# Patient Record
Sex: FEMALE | Race: WHITE | NOT HISPANIC OR LATINO | Employment: STUDENT | URBAN - METROPOLITAN AREA
[De-identification: names, ages, dates, MRNs, and addresses within clinical notes are randomized per-mention and may not be internally consistent; named-entity substitution may affect disease eponyms.]

---

## 2023-07-24 LAB
HEPATITIS B VIRUS SURFACE AG PRESENCE IN SERUM: NONREACTIVE
HEPATITIS C VIRUS AB PRESENCE IN SERUM: NONREACTIVE
HIV 1/ 2 AG/AB SCREEN: NONREACTIVE
SYPHILIS TOTAL AB: NONREACTIVE

## 2023-07-25 LAB
CHLAMYDIA TRACH., AMPLIFIED: NEGATIVE
N. GONORRHEA, AMPLIFIED: NEGATIVE
TRICHOMONAS VAGINALIS: NEGATIVE

## 2023-10-09 ENCOUNTER — TELEPHONE (OUTPATIENT)
Dept: OBSTETRICS AND GYNECOLOGY | Facility: CLINIC | Age: 23
End: 2023-10-09
Payer: COMMERCIAL

## 2023-10-11 DIAGNOSIS — B00.9 HERPES: Primary | ICD-10-CM

## 2023-10-11 RX ORDER — VALACYCLOVIR HYDROCHLORIDE 500 MG/1
500 TABLET, FILM COATED ORAL DAILY
Qty: 30 TABLET | Refills: 11 | Status: SHIPPED | OUTPATIENT
Start: 2023-10-11 | End: 2024-10-10

## 2023-10-11 RX ORDER — VALACYCLOVIR HYDROCHLORIDE 500 MG/1
500 TABLET, FILM COATED ORAL DAILY
COMMUNITY
End: 2023-10-11 | Stop reason: SDUPTHER

## 2023-10-11 RX ORDER — VALACYCLOVIR HYDROCHLORIDE 500 MG/1
500 TABLET, FILM COATED ORAL 2 TIMES DAILY
Qty: 6 TABLET | Refills: 5 | Status: SHIPPED | OUTPATIENT
Start: 2023-10-11 | End: 2023-10-11 | Stop reason: SDUPTHER

## 2023-11-04 PROBLEM — B00.9 HERPES: Status: ACTIVE | Noted: 2023-11-04

## 2023-11-04 PROBLEM — L03.119 CELLULITIS OF FOOT: Status: ACTIVE | Noted: 2023-11-04

## 2023-11-04 PROBLEM — R51.9 WORSENING HEADACHES: Status: ACTIVE | Noted: 2023-11-04

## 2023-11-04 PROBLEM — M67.02 TIGHT HEEL CORDS, ACQUIRED, BILATERAL: Status: ACTIVE | Noted: 2023-11-04

## 2023-11-04 PROBLEM — F41.9 ANXIETY AND DEPRESSION: Status: ACTIVE | Noted: 2023-11-04

## 2023-11-04 PROBLEM — F32.A ANXIETY AND DEPRESSION: Status: ACTIVE | Noted: 2023-11-04

## 2023-11-04 PROBLEM — R51.9 CHRONIC HEADACHES: Status: ACTIVE | Noted: 2023-11-04

## 2023-11-04 PROBLEM — G90.50 COMPLEX REGIONAL PAIN SYNDROME I: Status: ACTIVE | Noted: 2023-11-04

## 2023-11-04 PROBLEM — G90.512 COMPLEX REGIONAL PAIN SYNDROME TYPE 1 OF LEFT UPPER EXTREMITY: Status: ACTIVE | Noted: 2023-11-04

## 2023-11-04 PROBLEM — G43.909 MIGRAINE: Status: ACTIVE | Noted: 2023-11-04

## 2023-11-04 PROBLEM — G43.709 CHRONIC MIGRAINE WITHOUT AURA: Status: ACTIVE | Noted: 2023-11-04

## 2023-11-04 PROBLEM — G89.29 CHRONIC HEADACHES: Status: ACTIVE | Noted: 2023-11-04

## 2023-11-04 PROBLEM — M76.72 PERONEAL TENDINITIS OF LEFT LOWER EXTREMITY: Status: ACTIVE | Noted: 2023-11-04

## 2023-11-04 PROBLEM — G90.529: Status: ACTIVE | Noted: 2023-11-04

## 2023-11-04 PROBLEM — E66.01 MORBID OBESITY (MULTI): Status: ACTIVE | Noted: 2023-11-04

## 2023-11-04 PROBLEM — M76.71 PERONEAL TENDINITIS OF RIGHT LOWER EXTREMITY: Status: ACTIVE | Noted: 2023-11-04

## 2023-11-04 PROBLEM — M67.01 TIGHT HEEL CORDS, ACQUIRED, BILATERAL: Status: ACTIVE | Noted: 2023-11-04

## 2023-11-04 PROBLEM — M76.70 PERONEAL TENDINITIS: Status: ACTIVE | Noted: 2023-11-04

## 2023-11-04 PROBLEM — F95.8 MOTOR TIC DISORDER: Status: ACTIVE | Noted: 2023-11-04

## 2023-11-04 RX ORDER — FLUOXETINE HYDROCHLORIDE 40 MG/1
1 CAPSULE ORAL DAILY
COMMUNITY
Start: 2022-11-09

## 2023-11-04 RX ORDER — VENLAFAXINE HYDROCHLORIDE 75 MG/1
1 CAPSULE, EXTENDED RELEASE ORAL DAILY
COMMUNITY
Start: 2023-09-20

## 2023-11-04 RX ORDER — TRAZODONE HYDROCHLORIDE 150 MG/1
1 TABLET ORAL NIGHTLY PRN
COMMUNITY

## 2023-11-04 RX ORDER — CYCLOBENZAPRINE HCL 10 MG
1 TABLET ORAL EVERY 8 HOURS PRN
COMMUNITY
Start: 2023-08-14

## 2023-11-04 RX ORDER — NORETHINDRONE ACETATE AND ETHINYL ESTRADIOL AND FERROUS FUMARATE 1MG-20(21)
1 KIT ORAL DAILY
COMMUNITY
Start: 2022-11-14

## 2023-11-04 RX ORDER — LAMOTRIGINE 25 MG/1
1 TABLET ORAL DAILY
COMMUNITY
Start: 2022-12-07

## 2023-11-04 RX ORDER — RIZATRIPTAN BENZOATE 10 MG/1
1 TABLET ORAL ONCE AS NEEDED
COMMUNITY
Start: 2023-02-18

## 2023-11-04 RX ORDER — KETOROLAC TROMETHAMINE 10 MG/1
1 TABLET, FILM COATED ORAL EVERY 6 HOURS PRN
COMMUNITY

## 2023-11-04 RX ORDER — LURASIDONE HYDROCHLORIDE 20 MG/1
1 TABLET, FILM COATED ORAL DAILY
COMMUNITY
Start: 2023-02-08

## 2023-11-04 RX ORDER — METOPROLOL TARTRATE 50 MG/1
1 TABLET ORAL NIGHTLY
COMMUNITY
Start: 2023-01-16

## 2023-11-04 RX ORDER — SERTRALINE HYDROCHLORIDE 50 MG/1
1 TABLET, FILM COATED ORAL DAILY
COMMUNITY
Start: 2023-01-11

## 2023-11-04 RX ORDER — IBUPROFEN 800 MG/1
1 TABLET ORAL
COMMUNITY
Start: 2023-08-14

## 2023-11-04 RX ORDER — LEVONORGESTREL 52 MG/1
1 INTRAUTERINE DEVICE INTRAUTERINE
COMMUNITY
Start: 2021-07-12

## 2023-11-04 RX ORDER — LURASIDONE HYDROCHLORIDE 40 MG/1
1 TABLET, FILM COATED ORAL DAILY
COMMUNITY
Start: 2023-09-20

## 2023-11-04 RX ORDER — FREMANEZUMAB-VFRM 225 MG/1.5ML
1 INJECTION SUBCUTANEOUS
COMMUNITY
Start: 2023-03-08

## 2023-11-04 RX ORDER — LIDOCAINE 560 MG/1
1 PATCH PERCUTANEOUS; TOPICAL; TRANSDERMAL DAILY
COMMUNITY
Start: 2023-08-14

## 2023-11-04 RX ORDER — LAMOTRIGINE 100 MG/1
1 TABLET ORAL DAILY
COMMUNITY

## 2023-11-04 RX ORDER — SUMATRIPTAN SUCCINATE 100 MG/1
1 TABLET ORAL AS NEEDED
COMMUNITY

## 2023-11-04 RX ORDER — NAPROXEN 500 MG/1
1 TABLET ORAL EVERY 12 HOURS PRN
COMMUNITY
Start: 2023-02-22

## 2023-11-04 RX ORDER — ADAPALENE 1 MG/G
1 GEL TOPICAL
COMMUNITY
Start: 2023-05-23

## 2023-11-04 RX ORDER — DIPHENHYDRAMINE HCL 25 MG
1 CAPSULE ORAL
COMMUNITY
Start: 2022-09-16

## 2023-11-04 RX ORDER — CLINDAMYCIN PHOSPHATE 10 UG/ML
1 LOTION TOPICAL
COMMUNITY
Start: 2022-11-29

## 2023-11-04 RX ORDER — ONDANSETRON 4 MG/1
TABLET, ORALLY DISINTEGRATING ORAL
COMMUNITY

## 2023-11-04 RX ORDER — TOPIRAMATE 25 MG/1
1 TABLET ORAL NIGHTLY
COMMUNITY
Start: 2023-01-19

## 2024-08-15 ENCOUNTER — APPOINTMENT (OUTPATIENT)
Dept: NEUROLOGY | Facility: CLINIC | Age: 24
End: 2024-08-15
Payer: COMMERCIAL

## 2024-08-16 ENCOUNTER — TELEPHONE (OUTPATIENT)
Dept: OBSTETRICS AND GYNECOLOGY | Facility: CLINIC | Age: 24
End: 2024-08-16
Payer: COMMERCIAL

## 2024-08-16 DIAGNOSIS — B00.9 HERPES: Primary | ICD-10-CM

## 2024-08-16 RX ORDER — VALACYCLOVIR HYDROCHLORIDE 1 G/1
1000 TABLET, FILM COATED ORAL 2 TIMES DAILY
Qty: 20 TABLET | Refills: 0 | Status: SHIPPED | OUTPATIENT
Start: 2024-08-16 | End: 2024-08-26

## 2024-08-16 NOTE — TELEPHONE ENCOUNTER
Pt called she is currently having an HSV outbreak. Pt does currently have the Valtrex but is looking for the stronger script she has taken previously to clear up the out break. Please advise.

## 2024-09-04 ENCOUNTER — TELEMEDICINE (OUTPATIENT)
Dept: NEUROLOGY | Facility: CLINIC | Age: 24
End: 2024-09-04
Payer: COMMERCIAL

## 2024-09-04 DIAGNOSIS — G43.709 CHRONIC MIGRAINE WITHOUT AURA WITHOUT STATUS MIGRAINOSUS, NOT INTRACTABLE: Primary | ICD-10-CM

## 2024-09-04 PROCEDURE — 99214 OFFICE O/P EST MOD 30 MIN: CPT | Performed by: NURSE PRACTITIONER

## 2024-09-04 RX ORDER — FREMANEZUMAB-VFRM 225 MG/1.5ML
225 INJECTION SUBCUTANEOUS
Qty: 1 EACH | Refills: 11 | Status: SHIPPED | OUTPATIENT
Start: 2024-09-04

## 2024-09-04 NOTE — PROGRESS NOTES
Assessed today for follow-up of migraine.  She reports that she has been doing well on the Ajovy.  She has had 3 or 4 breakthrough migraines in the past year.  We had initially tried sumatriptan which caused side effects.  The we then tried rizatriptan which again caused side effects and patient ended up in the ER.  We will continue the Ajovy as she has been taking it for preventative treatment.  For abortive treatment would like to try her on Nurtec.  Discussed role of medicine, importance of taking medications, potential risks, benefits, and precautions to be taken.  Reviewed sleep hygiene and dietary modifications.  Patient to notify office after second use of Nurtec regarding efficacy.  Patient verbalized understanding.  Follow-up in 1 year or sooner if needed.    This note was created with voice recognition software and was not corrected for typographical or grammatical errors

## 2024-10-28 ENCOUNTER — APPOINTMENT (OUTPATIENT)
Dept: NEUROLOGY | Facility: CLINIC | Age: 24
End: 2024-10-28
Payer: COMMERCIAL

## 2025-05-02 ENCOUNTER — APPOINTMENT (OUTPATIENT)
Dept: PRIMARY CARE | Facility: CLINIC | Age: 25
End: 2025-05-02
Payer: COMMERCIAL

## 2025-05-05 ENCOUNTER — APPOINTMENT (OUTPATIENT)
Dept: PRIMARY CARE | Facility: CLINIC | Age: 25
End: 2025-05-05
Payer: COMMERCIAL

## 2025-05-05 VITALS
DIASTOLIC BLOOD PRESSURE: 80 MMHG | WEIGHT: 293 LBS | OXYGEN SATURATION: 95 % | SYSTOLIC BLOOD PRESSURE: 118 MMHG | HEART RATE: 96 BPM | HEIGHT: 65 IN | BODY MASS INDEX: 48.82 KG/M2

## 2025-05-05 DIAGNOSIS — E66.813 CLASS 3 SEVERE OBESITY DUE TO EXCESS CALORIES WITHOUT SERIOUS COMORBIDITY WITH BODY MASS INDEX (BMI) OF 60.0 TO 69.9 IN ADULT: ICD-10-CM

## 2025-05-05 DIAGNOSIS — E53.8 VITAMIN B12 DEFICIENCY: ICD-10-CM

## 2025-05-05 DIAGNOSIS — R63.5 WEIGHT GAIN: ICD-10-CM

## 2025-05-05 DIAGNOSIS — Z13.21 ENCOUNTER FOR VITAMIN DEFICIENCY SCREENING: ICD-10-CM

## 2025-05-05 DIAGNOSIS — Z00.00 WELLNESS EXAMINATION: Primary | ICD-10-CM

## 2025-05-05 DIAGNOSIS — G47.33 OBSTRUCTIVE SLEEP APNEA SYNDROME: ICD-10-CM

## 2025-05-05 PROCEDURE — 3008F BODY MASS INDEX DOCD: CPT

## 2025-05-05 PROCEDURE — 1036F TOBACCO NON-USER: CPT

## 2025-05-05 PROCEDURE — 99385 PREV VISIT NEW AGE 18-39: CPT

## 2025-05-05 RX ORDER — CHOLECALCIFEROL (VITAMIN D3) 25 MCG
1000 TABLET ORAL
COMMUNITY
Start: 2025-03-12 | End: 2026-03-12

## 2025-05-05 RX ORDER — DULAGLUTIDE 1.5 MG/.5ML
1.5 INJECTION, SOLUTION SUBCUTANEOUS WEEKLY
COMMUNITY

## 2025-05-05 ASSESSMENT — PATIENT HEALTH QUESTIONNAIRE - PHQ9
SUM OF ALL RESPONSES TO PHQ9 QUESTIONS 1 AND 2: 0
2. FEELING DOWN, DEPRESSED OR HOPELESS: NOT AT ALL
1. LITTLE INTEREST OR PLEASURE IN DOING THINGS: NOT AT ALL

## 2025-05-05 NOTE — PROGRESS NOTES
"  Subjective   Patient ID: Leticia Goode is a 24 y.o. female who presents for Establish Care (Pt states she is here to establish care. Pt states she does see an OBGYN. Last pap 2024. No other concerns at the moment.).    Patient presents to establish care and have yearly physical.  Patient states that she has been working with [facility] weight management clinic, and a nutritionist.  Patient states that they have started her on Trulicity however she has not had any changes in her weight.  Patient states that for breakfast she generally eats a protein shake, for lunch she will have a hard-boiled egg, yogurt with fruit, and for dinner will have chicken, rice, broccoli.  Patient states that she does have a stationary bike, and takes walks with her daughter and dog.    Patient states that she does have a history of anxiety/depression/mood disorder and does follow-up regularly with psychiatry who prescribes her Lamictal, Latuda, Effexor, trazodone.      Past Medical, Surgical, Social and Family Hx reviewed-Yes        Last pap: 2024  Last Mammogram: N/A  Colon CA screening Hx: N/A  Labs: TSH  Up to date on immunizations: Up to date  Dentist in the past year: Yes     Menstruation: No periods with Mirena   Sexual Activity: No concerns         PE:  /80   Pulse 96   Ht 1.651 m (5' 5\")   Wt (!) 166 kg (367 lb)   SpO2 95%   BMI 61.07 kg/m²   Alert adult woman, NAD  HEENT clear  Neck supple, No LAD  Heart RRR no murmur  Lungs CTA bilat  Abdomen benign, normal BS, soft NT/ND  Skin warm, dry, clear  Neuro grossly normal, gait WNL  Affect pleasant and appropriate, memory and judgement WNL    Discussed with patient that with her diagnosis of sleep apnea, we will trial Zepbound and see if we can get her approved.  Discussed with patient if that is the case we will stop the Trulicity.  We also discussed different lifestyle modifications that she can do to her improve her overall weight.  Lab work was ordered to rule " out further causes of her obesity.  If patient's Zepbound gets approved, we will see her back in 1 month.    Assessment/Plan   Problem List Items Addressed This Visit           ICD-10-CM    Wellness examination - Primary Z00.00    Weight gain R63.5    Relevant Orders    Tsh With Reflex To Free T4 If Abnormal    Testosterone, total and free    Comprehensive metabolic panel    Vitamin B12 deficiency E53.8    Relevant Orders    Vitamin B12    Methylmalonic acid, serum    Comprehensive metabolic panel    Obstructive sleep apnea syndrome G47.33    Relevant Medications    tirzepatide, weight loss, (Zepbound) 2.5 mg/0.5 mL injection    Class 3 severe obesity due to excess calories without serious comorbidity with body mass index (BMI) of 60.0 to 69.9 in adult E66.813, Z68.44    Relevant Medications    tirzepatide, weight loss, (Zepbound) 2.5 mg/0.5 mL injection     Other Visit Diagnoses         Codes      Encounter for vitamin deficiency screening     Z13.21    Relevant Orders    Vitamin D 25-Hydroxy,Total (for eval of Vitamin D levels)

## 2025-05-06 ENCOUNTER — APPOINTMENT (OUTPATIENT)
Dept: OBSTETRICS AND GYNECOLOGY | Facility: CLINIC | Age: 25
End: 2025-05-06
Payer: COMMERCIAL

## 2025-05-07 ENCOUNTER — APPOINTMENT (OUTPATIENT)
Dept: PRIMARY CARE | Facility: CLINIC | Age: 25
End: 2025-05-07
Payer: COMMERCIAL

## 2025-05-08 ENCOUNTER — TELEMEDICINE (OUTPATIENT)
Dept: PRIMARY CARE | Facility: CLINIC | Age: 25
End: 2025-05-08
Payer: COMMERCIAL

## 2025-05-08 ENCOUNTER — TELEPHONE (OUTPATIENT)
Dept: PRIMARY CARE | Facility: CLINIC | Age: 25
End: 2025-05-08

## 2025-05-08 DIAGNOSIS — J02.9 PHARYNGITIS, UNSPECIFIED ETIOLOGY: Primary | ICD-10-CM

## 2025-05-08 LAB
25(OH)D3+25(OH)D2 SERPL-MCNC: 23 NG/ML (ref 30–100)
ALBUMIN SERPL-MCNC: 4.3 G/DL (ref 3.6–5.1)
ALP SERPL-CCNC: 58 U/L (ref 31–125)
ALT SERPL-CCNC: 19 U/L (ref 6–29)
ANION GAP SERPL CALCULATED.4IONS-SCNC: 11 MMOL/L (CALC) (ref 7–17)
AST SERPL-CCNC: 16 U/L (ref 10–30)
BILIRUB SERPL-MCNC: 0.7 MG/DL (ref 0.2–1.2)
BUN SERPL-MCNC: 5 MG/DL (ref 7–25)
CALCIUM SERPL-MCNC: 9.2 MG/DL (ref 8.6–10.2)
CHLORIDE SERPL-SCNC: 103 MMOL/L (ref 98–110)
CO2 SERPL-SCNC: 26 MMOL/L (ref 20–32)
CREAT SERPL-MCNC: 0.77 MG/DL (ref 0.5–0.96)
EGFRCR SERPLBLD CKD-EPI 2021: 112 ML/MIN/1.73M2
GLUCOSE SERPL-MCNC: 83 MG/DL (ref 65–99)
METHYLMALONATE SERPL-SCNC: 84 NMOL/L (ref 55–335)
POTASSIUM SERPL-SCNC: 4.2 MMOL/L (ref 3.5–5.3)
PROT SERPL-MCNC: 7 G/DL (ref 6.1–8.1)
SODIUM SERPL-SCNC: 140 MMOL/L (ref 135–146)
TESTOST FREE SERPL-MCNC: 7.1 PG/ML (ref 0.1–6.4)
TESTOST SERPL-MCNC: 54 NG/DL (ref 2–45)
TSH SERPL-ACNC: 1.48 MIU/L
VIT B12 SERPL-MCNC: 278 PG/ML (ref 200–1100)

## 2025-05-08 PROCEDURE — 99214 OFFICE O/P EST MOD 30 MIN: CPT | Performed by: FAMILY MEDICINE

## 2025-05-08 RX ORDER — PHENTERMINE HYDROCHLORIDE 37.5 MG/1
1 TABLET ORAL
COMMUNITY
Start: 2025-02-21 | End: 2025-05-08 | Stop reason: WASHOUT

## 2025-05-08 RX ORDER — AMOXICILLIN 500 MG/1
500 CAPSULE ORAL 2 TIMES DAILY
Qty: 20 CAPSULE | Refills: 0 | Status: SHIPPED | OUTPATIENT
Start: 2025-05-08 | End: 2025-05-18

## 2025-05-08 RX ORDER — LAMOTRIGINE 150 MG/1
2 TABLET ORAL
COMMUNITY
Start: 2025-04-21

## 2025-05-08 RX ORDER — VALACYCLOVIR HYDROCHLORIDE 500 MG/1
TABLET, FILM COATED ORAL
COMMUNITY

## 2025-05-08 RX ORDER — VENLAFAXINE HYDROCHLORIDE 150 MG/1
150 TABLET, EXTENDED RELEASE ORAL
COMMUNITY
Start: 2025-04-22

## 2025-05-08 RX ORDER — LIRAGLUTIDE 6 MG/ML
INJECTION SUBCUTANEOUS
COMMUNITY
Start: 2025-03-08 | End: 2025-05-08 | Stop reason: WASHOUT

## 2025-05-08 RX ORDER — LURASIDONE HYDROCHLORIDE 80 MG/1
TABLET, FILM COATED ORAL
COMMUNITY
Start: 2025-04-14

## 2025-05-08 RX ORDER — FLUTICASONE PROPIONATE 50 MCG
SPRAY, SUSPENSION (ML) NASAL
COMMUNITY
Start: 2025-03-26 | End: 2025-05-08 | Stop reason: WASHOUT

## 2025-05-08 NOTE — PROGRESS NOTES
I performed this visit using real-time telehealth tools, including an audio/video OR telephone connection between the patient listed who was located in the [facility] and myself, Amy Ma (Board certified in the [facility]).At the start of the visit, I introduced myself as Dr. Garcia and verified the patients name, , and current physical location.  If they were currently outside of the state of [place], the visit was ended and the patient was referred to alternative means for evaluation and treatment.  The patient was made aware of the limitations of the telehealth visit.  They will not be physically examined and all issues may not be appropriate for a telehealth visit.  If necessary, an in-    In preparing for this visit and writing this note, I reviewed previous electronic medical records (labs, imaging and medical charts) of the patient available in the physician portal. Significant findings which helped in decision making are recorded in this encounter charting.  All allergies were reviewed with the patient and all medications reconciled verbally with the patient at the beginning oft the visit.    Chief complaint:     ST--started yesterday-- salt water and listerine  Worse today  Concerned because 12 year old brother with strep--she lives with him--dx last Thursday--   Has not taken temp but feels hot today  No chills  Cough started today  Very ST  Occas sneezing from allergies  LN in neck normal  Eating and drinking fine  No HA-- had dental work work so may have some from that  Gagging but no other GI sxs      All other ROS (-)    General appearance:  Vitals available from patient? no  Alert, oriented, pleasant, in no apparent distress? yes  Answers questions appropriately? yes  Eyes clear? yes  Is patient in respiratory distress? no  Throat exam: not available  Is patient coughing during consult: NO  Audible wheezing noted? : no  Pt sounds congested?:  no  Sniffing or rhinorrhea?:  no  Tender neck  LAD by pt exam?: NO  Psychiatric: Affect normal? yes  Other relevant physical exam:    Assessment and Plan:    Pharyngitis--presumed strep  Amox 500mg BID x 10 days  Tylenol or motrin as needed      Discussed with patient during visit  differential diagnosis; viral versus bacterial infection;  (if relevant); use of medications prescribed and possible side effects; appropriate over-the-counter medications; possible complications ; when to follow-up for in person evaluation.  All patient's questions were answered. Patient has good decision making capacity.  They are alert to person, place, time and situation.  Patient has the ability to communicate choice, understand information, consequences, and reason rationally.  If sent for in person care at  or ED,    I explained why Urgent in person exam was necessary and that failure to have further evaluation, and treatment today may lead to, negative outcomes, permanent disability, or even death.   If not sent for in person care today,   follow-up with your PCP in 2-3 days, sooner if not  improving.    Follow-up immediately if symptoms worsen.   If experiencing symptoms including but not limited to lethargy / chest pain / weakness / dizziness / difficulty breathing please call 911 or go to the closest emergency department for immediate care.   Limitations to telemedicine include inability to do a complete and accurate physical exam.    Any concerns regarding this were conveyed with the patient and in person follow-up recommended if the nature of their concern/illness does not progress as anticipated during this visit.

## 2025-05-08 NOTE — PATIENT INSTRUCTIONS
"Pharyngitis--presumed strep  Amox 500mg BID x 10 days  Tylenol or motrin as needed    Please send me a COGEONt message if you have any questions or concerns.  FOR NON URGENT questions only.  Allow up to 72 hours for response.    If you have prescription issues or other questions you can email   Killian Alfonso, [facility] Health Coordinator, at   [email or web address]     Rest and drink plenty of fluids    Tylenol and or motrin as needed for pain and fever (unless you have been told not to take these because of your personal medical history)    Discussed options and precautions (complaint specific and may include)  Viral versus bacterial infection; use of medications; possible side effects; appropriate over-the-counter medications; possible complications and /or when to follow-up.    if sent for in person care at  or ED,  it was explained why and failure to have \"higher level of care\" further evaluation, and treatment today may lead, but is not limited to negative outcomes, permanent disability, or even death.     All red flags requiring in person care were discussed.    If not sent for in person care today, follow-up with your PCP in 2-3 days, sooner if not  improving.    Follow-up immediately if symptoms worsen. If experiencing symptoms including but not limited to lethargy / chest pain / weakness / dizziness / difficulty breathing please call 911 or go to the emergency department for immediate care.     All patient's questions were answered. Patient has good decision making capacity.  They are alert to person, place, time and situation.  Patient has the ability to communicate choice, understand information, consequences, and reason rationally.      ____________________________________________________________________________________________________________  To connect with a new PCP please visit [email or web address] or call [phone]                          "

## 2025-05-15 ENCOUNTER — PATIENT MESSAGE (OUTPATIENT)
Dept: PRIMARY CARE | Facility: CLINIC | Age: 25
End: 2025-05-15
Payer: COMMERCIAL

## 2025-05-15 DIAGNOSIS — G47.33 OBSTRUCTIVE SLEEP APNEA SYNDROME: ICD-10-CM

## 2025-05-15 DIAGNOSIS — E28.2 PCOS (POLYCYSTIC OVARIAN SYNDROME): Primary | ICD-10-CM

## 2025-05-15 DIAGNOSIS — E66.813 CLASS 3 SEVERE OBESITY DUE TO EXCESS CALORIES WITHOUT SERIOUS COMORBIDITY WITH BODY MASS INDEX (BMI) OF 60.0 TO 69.9 IN ADULT: ICD-10-CM

## 2025-05-19 ENCOUNTER — APPOINTMENT (OUTPATIENT)
Dept: PRIMARY CARE | Facility: CLINIC | Age: 25
End: 2025-05-19
Payer: COMMERCIAL

## 2025-05-21 ENCOUNTER — APPOINTMENT (OUTPATIENT)
Dept: DERMATOLOGY | Facility: CLINIC | Age: 25
End: 2025-05-21
Payer: COMMERCIAL

## 2025-05-21 DIAGNOSIS — L70.0 ACNE VULGARIS: ICD-10-CM

## 2025-05-21 DIAGNOSIS — L21.9 SEBORRHEIC DERMATITIS: Primary | ICD-10-CM

## 2025-05-21 DIAGNOSIS — L73.2 HIDRADENITIS SUPPURATIVA: ICD-10-CM

## 2025-05-21 PROCEDURE — 1036F TOBACCO NON-USER: CPT | Performed by: DERMATOLOGY

## 2025-05-21 PROCEDURE — 99214 OFFICE O/P EST MOD 30 MIN: CPT | Performed by: DERMATOLOGY

## 2025-05-21 RX ORDER — SPIRONOLACTONE 50 MG/1
50 TABLET, FILM COATED ORAL 2 TIMES DAILY
Qty: 60 TABLET | Refills: 1 | Status: SHIPPED | OUTPATIENT
Start: 2025-05-21 | End: 2025-07-20

## 2025-05-21 RX ORDER — CLINDAMYCIN PHOSPHATE 10 UG/ML
LOTION TOPICAL EVERY MORNING
Qty: 60 ML | Refills: 3 | Status: SHIPPED | OUTPATIENT
Start: 2025-05-21 | End: 2026-05-21

## 2025-05-21 RX ORDER — TRETINOIN 0.25 MG/G
CREAM TOPICAL NIGHTLY
Qty: 45 G | Refills: 3 | Status: SHIPPED | OUTPATIENT
Start: 2025-05-21 | End: 2026-05-21

## 2025-05-21 RX ORDER — KETOCONAZOLE 20 MG/G
CREAM TOPICAL 2 TIMES DAILY
Qty: 60 G | Refills: 11 | Status: SHIPPED | OUTPATIENT
Start: 2025-05-21

## 2025-05-21 RX ORDER — DULAGLUTIDE 3 MG/.5ML
3 INJECTION, SOLUTION SUBCUTANEOUS WEEKLY
Qty: 2 ML | Refills: 1 | Status: SHIPPED | OUTPATIENT
Start: 2025-05-21

## 2025-05-21 RX ORDER — BENZOYL PEROXIDE 50 MG/ML
LIQUID TOPICAL EVERY MORNING
Qty: 142 G | Refills: 3 | Status: SHIPPED | OUTPATIENT
Start: 2025-05-21 | End: 2026-05-21

## 2025-05-21 ASSESSMENT — DERMATOLOGY QUALITY OF LIFE (QOL) ASSESSMENT
RATE HOW BOTHERED YOU ARE BY EFFECTS OF YOUR SKIN PROBLEMS ON YOUR ACTIVITIES (EG, GOING OUT, ACCOMPLISHING WHAT YOU WANT, WORK ACTIVITIES OR YOUR RELATIONSHIPS WITH OTHERS): 0 - NEVER BOTHERED
RATE HOW EMOTIONALLY BOTHERED YOU ARE BY YOUR SKIN PROBLEM (FOR EXAMPLE, WORRY, EMBARRASSMENT, FRUSTRATION): 3
RATE HOW BOTHERED YOU ARE BY SYMPTOMS OF YOUR SKIN PROBLEM (EG, ITCHING, STINGING BURNING, HURTING OR SKIN IRRITATION): 0 - NEVER BOTHERED
WHAT SINGLE SKIN CONDITION LISTED BELOW IS THE PATIENT ANSWERING THE QUALITY-OF-LIFE ASSESSMENT QUESTIONS ABOUT: NONE OF THE ABOVE
RATE HOW BOTHERED YOU ARE BY EFFECTS OF YOUR SKIN PROBLEMS ON YOUR ACTIVITIES (EG, GOING OUT, ACCOMPLISHING WHAT YOU WANT, WORK ACTIVITIES OR YOUR RELATIONSHIPS WITH OTHERS): 0 - NEVER BOTHERED
RATE HOW EMOTIONALLY BOTHERED YOU ARE BY YOUR SKIN PROBLEM (FOR EXAMPLE, WORRY, EMBARRASSMENT, FRUSTRATION): 3
WHAT SINGLE SKIN CONDITION LISTED BELOW IS THE PATIENT ANSWERING THE QUALITY-OF-LIFE ASSESSMENT QUESTIONS ABOUT: NONE OF THE ABOVE
RATE HOW BOTHERED YOU ARE BY SYMPTOMS OF YOUR SKIN PROBLEM (EG, ITCHING, STINGING BURNING, HURTING OR SKIN IRRITATION): 0 - NEVER BOTHERED

## 2025-05-21 ASSESSMENT — PATIENT GLOBAL ASSESSMENT (PGA): WHAT IS THE PGA: PATIENT GLOBAL ASSESSMENT:  1 - CLEAR

## 2025-05-21 NOTE — PROGRESS NOTES
Subjective     Leticia Goode is a 24 y.o. female who presents for the following: Acne.     Last derm visit 5/23/23 for acne with Dr. Clark. Her acne is actually under fairly good control but her forehead is greasy but then it is dry and flaky around her nose. Has tried OTC moisturizers.     She also thinks she may have hidradenitis suppurativa but she has not seen someone in person. She works long hours M-F, hard to come in for in person appt. She gets boils on upper inner thighs and on mons. They are painful and appear intermittently. They were really bad a couple years ago especially on thighs. Often worse in summer. She does not know if it flares with her menstrual cycle because she uses a long-term contraception that disrupts her cycle.     Review of Systems:  No other skin or systemic complaints other than what is documented elsewhere in the note.    The following portions of the chart were reviewed this encounter and updated as appropriate:   Tobacco  Allergies  Meds  Problems  Med Hx  Surg Hx         Skin Cancer History  Biopsy Log Book  No skin cancers from Specimen Tracking.    Additional History      Specialty Problems    None       Objective   Well appearing patient in no apparent distress; mood and affect are within normal limits.    A focused skin examination was performed. All findings within normal limits unless otherwise noted below.    Assessment/Plan   Skin Exam  1. SEBORRHEIC DERMATITIS  Head - Anterior (Face)  Light pink dry flaky skin around nose  - start with ketoconazole cream  - can consider sulfur wash in future  Related Procedures  Follow Up In Dermatology - Established Patient  Related Medications  ketoconazole (NIZOral) 2 % cream  Apply topically 2 times a day. To affected areas for 2-4 weeks as needed for dry skin around nose then stop or continue on weekends for maintenance/prevention  2. ACNE VULGARIS  Head - Anterior (Face)  Minimal pink papules and open comedones. Oiliness  of glabella  -Discussed diagnosis of acne  -Discussed natural history of condition and expectations for treatment  -Recommend:  - topicals as below  Related Medications  benzoyl peroxide 5 % external wash  Apply topically once daily in the morning. To wash face. May use in the shower daily as body wash to hidradenitis suppurativa prone areas. May bleach fabrics, use an old or white towel  clindamycin (Cleocin T) 1 % lotion  Apply topically once daily in the morning. To new acne bumps on face. For hidradenitis suppurativa can use 2x per day as needed  tretinoin (Retin-A) 0.025 % cream  Apply topically once daily at bedtime. A pea-sized amount to the whole face, start every 2-3 nights and gradually increase to nightly  3. HIDRADENITIS SUPPURATIVA  Left Medial Thigh, Pubic, Right Medial Thigh  No exam on virtual visit    -Discussed nature of condition  -Discussed treatment options  - start with first-line therapy with benzoyl peroxide wash and clindamycin lotion  - can consider oral doxycycline for flares but would like to have a better evaluation first      Related Medications  benzoyl peroxide 5 % external wash  Apply topically once daily in the morning. To wash face. May use in the shower daily as body wash to hidradenitis suppurativa prone areas. May bleach fabrics, use an old or white towel  clindamycin (Cleocin T) 1 % lotion  Apply topically once daily in the morning. To new acne bumps on face. For hidradenitis suppurativa can use 2x per day as needed    Virtual or Telephone Consent    An interactive audio and video telecommunication system which permits real time communications between the patient (at the originating site) and provider (at the distant site) was utilized to provide this telehealth service.   Verbal consent was requested and obtained from Leticia Goode on this date, 05/21/25 for a telehealth visit and the patient's location was confirmed at the time of the visit.      Follow up 3 months virtual  visit   If hidradenitis suppurativa not improving would need to switch to in person  For now we will keep it at virtual visit since that is easier with her work schedule. She knows she would need to call ahead of time to switch to in person

## 2025-05-21 NOTE — PATIENT INSTRUCTIONS
"YOUR TOPICAL ACNE TREATMENT PLAN    Morning:    Wash face/body with:    Benzoyl peroxide  _4-5___% (Examples: PanOxyl, Clean&Clear) - this can bleach! Use an old towel or a white towel to dry off carefully             2. Apply medication(s):  Apply ___Clindamycin lotion__________________to affected areas of acne on face and/or body -- Use as a \"spot treatment\" for new and active acne bumps  Apply ketoconazole cream to dry skin around eyebrows and nose         3. Moisturize:  If dry, apply non-scented, non-comedogenic moisturizer of your choice to affected areas. Picking a moisturizer with SPF 30+ to use in the morning will help dark marks fade faster. Examples: Neutrogena, Cetaphil, CeraVe, EltaMD (online)        Evening:    Wash face with:    Gentle, non-medicated wash (Examples: Cetaphil, CeraVe, Neutrogena UltraGentle)         2. Apply medication(s):  Apply ____tretinoin_________________to affected areas of acne on face (see below for detailed instructions) -- Use ALL OVER, careful around edges of nose to treat current acne, prevent future acne, and treat old acne scars and marks    When applying topical medications to the face, use the “5-dot” method. Take a small pea-sized amount and place dots in each of 5 locations of your face: mid-forehead, each cheek, nose, and chin. Then rub in. You should not see a “film” of the medication on your skin; if you do, you're probably using too much.    Topical medications may lead to dryness where you use them. This almost always improves as your skin gets used to the medication (about 2-3 weeks). Some tips to get you through this time include waiting 15-20 minutes after washing before applying the topical medication and starting out with use every 2-3 days, gradually working up to “every day” use.         3. Moisturize:  If dry, apply non-scented, non-comedogenic moisturizer of your choice to affected areas. Examples: Neutrogena, Cetaphil, CeraVe, EltaMD " (online)          For the hidradenitis  - benzoyl peroxide wash as a body wash in the shower to upper inner thighs and mons. Use at least 2x per week, but you can use it daily  - clindamycin lotion can be used when you feel a boil or a flare. You can use it 2x per day as needed

## 2025-05-21 NOTE — Clinical Note
-Discussed nature of condition  -Discussed treatment options  - start with first-line therapy with benzoyl peroxide wash and clindamycin lotion  - can consider oral doxycycline for flares but would like to have a better evaluation first

## 2025-05-21 NOTE — Clinical Note
-Discussed diagnosis of acne  -Discussed natural history of condition and expectations for treatment  -Recommend:  - topicals as below

## 2025-06-05 DIAGNOSIS — G43.709 CHRONIC MIGRAINE WITHOUT AURA WITHOUT STATUS MIGRAINOSUS, NOT INTRACTABLE: Primary | ICD-10-CM

## 2025-06-09 ENCOUNTER — SPECIALTY PHARMACY (OUTPATIENT)
Dept: PHARMACY | Facility: CLINIC | Age: 25
End: 2025-06-09

## 2025-06-19 ENCOUNTER — APPOINTMENT (OUTPATIENT)
Dept: NEUROLOGY | Facility: CLINIC | Age: 25
End: 2025-06-19
Payer: COMMERCIAL

## 2025-06-24 ENCOUNTER — APPOINTMENT (OUTPATIENT)
Dept: OBSTETRICS AND GYNECOLOGY | Facility: CLINIC | Age: 25
End: 2025-06-24
Payer: COMMERCIAL

## 2025-07-11 DIAGNOSIS — E66.813 CLASS 3 SEVERE OBESITY DUE TO EXCESS CALORIES WITHOUT SERIOUS COMORBIDITY WITH BODY MASS INDEX (BMI) OF 60.0 TO 69.9 IN ADULT: Primary | ICD-10-CM

## 2025-07-17 ENCOUNTER — SPECIALTY PHARMACY (OUTPATIENT)
Dept: PHARMACY | Facility: CLINIC | Age: 25
End: 2025-07-17

## 2025-07-17 ENCOUNTER — TELEPHONE (OUTPATIENT)
Dept: PRIMARY CARE | Facility: CLINIC | Age: 25
End: 2025-07-17

## 2025-07-17 ENCOUNTER — OFFICE VISIT (OUTPATIENT)
Dept: NEUROLOGY | Facility: CLINIC | Age: 25
End: 2025-07-17
Payer: COMMERCIAL

## 2025-07-17 VITALS
SYSTOLIC BLOOD PRESSURE: 144 MMHG | BODY MASS INDEX: 47.09 KG/M2 | HEIGHT: 66 IN | DIASTOLIC BLOOD PRESSURE: 93 MMHG | WEIGHT: 293 LBS | HEART RATE: 88 BPM

## 2025-07-17 DIAGNOSIS — Z78.9 HEPATITIS B VACCINATION STATUS UNKNOWN: ICD-10-CM

## 2025-07-17 DIAGNOSIS — Z78.9 UNKNOWN VARICELLA VACCINATION STATUS: ICD-10-CM

## 2025-07-17 DIAGNOSIS — Z78.9 MEASLES, MUMPS, RUBELLA (MMR) VACCINATION STATUS UNKNOWN: Primary | ICD-10-CM

## 2025-07-17 DIAGNOSIS — G43.709 CHRONIC MIGRAINE WITHOUT AURA WITHOUT STATUS MIGRAINOSUS, NOT INTRACTABLE: Primary | ICD-10-CM

## 2025-07-17 PROCEDURE — RXMED WILLOW AMBULATORY MEDICATION CHARGE

## 2025-07-17 PROCEDURE — 99214 OFFICE O/P EST MOD 30 MIN: CPT | Performed by: NURSE PRACTITIONER

## 2025-07-17 PROCEDURE — 3008F BODY MASS INDEX DOCD: CPT | Performed by: NURSE PRACTITIONER

## 2025-07-17 RX ORDER — CARIPRAZINE 1.5 MG/1
1 CAPSULE, GELATIN COATED ORAL
COMMUNITY
Start: 2025-07-09

## 2025-07-17 RX ORDER — ONDANSETRON 4 MG/1
4 TABLET, ORALLY DISINTEGRATING ORAL EVERY 8 HOURS PRN
Qty: 20 TABLET | Refills: 3 | Status: SHIPPED | OUTPATIENT
Start: 2025-07-17

## 2025-07-17 ASSESSMENT — ENCOUNTER SYMPTOMS
OCCASIONAL FEELINGS OF UNSTEADINESS: 0
LOSS OF SENSATION IN FEET: 0
DEPRESSION: 0

## 2025-07-17 ASSESSMENT — ANXIETY QUESTIONNAIRES
GAD7 TOTAL SCORE: 0
5. BEING SO RESTLESS THAT IT IS HARD TO SIT STILL: NOT AT ALL
IF YOU CHECKED OFF ANY PROBLEMS ON THIS QUESTIONNAIRE, HOW DIFFICULT HAVE THESE PROBLEMS MADE IT FOR YOU TO DO YOUR WORK, TAKE CARE OF THINGS AT HOME, OR GET ALONG WITH OTHER PEOPLE: NOT DIFFICULT AT ALL
2. NOT BEING ABLE TO STOP OR CONTROL WORRYING: NOT AT ALL
4. TROUBLE RELAXING: NOT AT ALL
3. WORRYING TOO MUCH ABOUT DIFFERENT THINGS: NOT AT ALL
7. FEELING AFRAID AS IF SOMETHING AWFUL MIGHT HAPPEN: NOT AT ALL
1. FEELING NERVOUS, ANXIOUS, OR ON EDGE: NOT AT ALL
6. BECOMING EASILY ANNOYED OR IRRITABLE: NOT AT ALL

## 2025-07-17 ASSESSMENT — PATIENT HEALTH QUESTIONNAIRE - PHQ9
1. LITTLE INTEREST OR PLEASURE IN DOING THINGS: NOT AT ALL
2. FEELING DOWN, DEPRESSED OR HOPELESS: NOT AT ALL
SUM OF ALL RESPONSES TO PHQ9 QUESTIONS 1 AND 2: 0

## 2025-07-17 NOTE — ASSESSMENT & PLAN NOTE
Orders:    rimegepant (Nurtec ODT) 75 mg tablet,disintegrating; Dissolve 1 tablet (75 mg) in the mouth once daily if needed (migraine).

## 2025-07-17 NOTE — PROGRESS NOTES
Chief Complaint   Patient presents with    Migraine       Subjective   HPI  Leticia Goode is a 24 y.o. year old female who presents with chief complaint Chronic migraine without aura without status migrainosus, not intractable [G43.709]    Leticia is experiencing  12-16 HA days per month, 12-16 of the HAs meet migraine criteria.   Has been off of the Ajovy for 2 months due to insurance/pharmacy challenges.  The headaches are usually moderate, pounding, and throbbing and are located behind eyes/across forehead, generally symmetric.   Generally, headaches last about 1 hours in duration. With Nurtec.  Associated photophobia and nausea  The headaches are not positional.   Headaches are worsened with exertion. No change in character with sneezing, coughing and bending over.   The current rescue medication Nurtec starts to take effect within 1    hours and decreases the headache by 100%.   Work attendance or other daily activities affected:  pushes through the pain  Caffeine:  2 cups of coffee per day  Sleep:  7-8 hours per night  Stressors:  work  The patient denies the presence of any associated double vision, speech problems, confusion, facial or extremity weakness or numbness or problems with coordination. Denies other neurological history of seizures, stroke, or TIA.        Current/ past HA treatments:  Preventive:  Venlafaxine  Fluoxetine  Metoprolol  Topiramate  Ajovy - no longer on insurance formulary      Rescue:  Acetaminophen  Ibuprofen  Sumatriptan  Rizatriptan  Nurtec    Current Medications[1]    RX Allergies[2]    Medical History[3]  Surgical History[4]  Family History[5]  Social History     Tobacco Use    Smoking status: Never    Smokeless tobacco: Never   Substance Use Topics    Alcohol use: Yes     Comment: occasionally     Social History     Substance and Sexual Activity   Drug Use Not Currently        ROS  As noted in HPI, otherwise all other systems have been reviewed are negative for complaint.  "    Objective   General Appearance: Leticia is well-developed, well-nourished, 24 y.o. year old female, in no acute distress. Makes good eye contact, is alert, interactive, and cooperative. Demonstrates recent & remote memory recall. Subjective information consistent with objective assessment.     Vitals:    07/17/25 0859   BP: (!) 144/93   Pulse: 88   Weight: (!) 159 kg (350 lb)   Height: 1.676 m (5' 6\")       Lab Results   Component Value Date    WBC 9.0 09/19/2022    RBC 4.71 09/19/2022    HGB 14.6 09/19/2022    HCT 45.4 09/19/2022     09/19/2022     05/05/2025    K 4.2 05/05/2025     05/05/2025    BUN 5 (L) 05/05/2025    CREATININE 0.77 05/05/2025    EGFR 112 05/05/2025    CALCIUM 9.2 05/05/2025    ALKPHOS 58 05/05/2025    AST 16 05/05/2025    ALT 19 05/05/2025    [id number] 278 05/05/2025    VITD25 23 (L) 05/05/2025    HGBA1C 5.4 06/05/2024    CHOL 160 09/19/2022    HDL 44.0 09/19/2022    TRIG 87 09/19/2022    TSH 1.48 05/05/2025        Mental Status  Patient Health Questionnaire-2 Score: 0   CELINA-7 Total Score: 0 (7/17/2025  9:01 AM)       Neurological Exam  Cranial Nerves  CN III, IV, VI: Extraocular movements intact bilaterally. Normal lids and orbits bilaterally.  CN VII: Full and symmetric facial movement.  CN VIII: Hearing is normal.    RECORDS REVIEW:  Previous records (provider notes, laboratory reports, and imaging studies were reviewed and summarized).    Previous neuronote reviewed.  Labs reviewed.  Vitals reviewed.    Assessment & Plan  Chronic migraine without aura without status migrainosus, not intractable    Orders:    rimegepant (Nurtec ODT) 75 mg tablet,disintegrating; Dissolve 1 tablet (75 mg) in the mouth once daily if needed (migraine).         ASSESSMENT/PLAN:  Start Emgality 240mg loading dose for preventative treatment of migraine.  Emgality 120mg starting 1 month after loading dose for preventative treatment of migraine.  Continue Nurtec 75mg for abortive treatment of " migraine.  Follow-up in 3 months.        Xiomara Ureña, APRN-CNP           [1]   Current Outpatient Medications:     benzoyl peroxide 5 % external wash, Apply topically once daily in the morning. To wash face. May use in the shower daily as body wash to hidradenitis suppurativa prone areas. May bleach fabrics, use an old or white towel, Disp: 142 g, Rfl: 3    clindamycin (Cleocin T) 1 % lotion, Apply topically once daily in the morning. To new acne bumps on face. For hidradenitis suppurativa can use 2x per day as needed, Disp: 60 mL, Rfl: 3    ketoconazole (NIZOral) 2 % cream, Apply topically 2 times a day. To affected areas for 2-4 weeks as needed for dry skin around nose then stop or continue on weekends for maintenance/prevention, Disp: 60 g, Rfl: 11    levonorgestrel (Mirena) 21 mcg/24 hours (8 yrs) 52 mg IUD, 52 mg by intrauterine route. As directed., Disp: , Rfl:     ondansetron ODT (Zofran-ODT) 4 mg disintegrating tablet, Take by mouth. As directed., Disp: , Rfl:     rimegepant (Nurtec ODT) 75 mg tablet,disintegrating, Take 1 tablet (75 mg) by mouth if needed (migraine)., Disp: 8 tablet, Rfl: 2    spironolactone (Aldactone) 50 mg tablet, Take 1 tablet (50 mg) by mouth 2 times a day., Disp: 60 tablet, Rfl: 1    tretinoin (Retin-A) 0.025 % cream, Apply topically once daily at bedtime. A pea-sized amount to the whole face, start every 2-3 nights and gradually increase to nightly, Disp: 45 g, Rfl: 3    valACYclovir (Valtrex) 500 mg tablet, TAKE ONE TABLET BY MOUTH DAILY. TAKE 500 MG DAILY FOR SUPPRESSION OR 1000 MG DAILY FOR 5 DAYS FOR OUTBREAK., Disp: , Rfl:     Vraylar 1.5 mg capsule, Take 1 capsule (1.5 mg) by mouth early in the morning.., Disp: , Rfl:     cholecalciferol (Vitamin D-3) 25 mcg (1,000 units) tablet, Take 1 tablet (1,000 Units) by mouth once daily. (Patient not taking: Reported on 7/17/2025), Disp: , Rfl:     dulaglutide (Trulicity) 3 mg/0.5 mL injection, Inject 3 mg under the skin 1 (one) time  per week. (Patient not taking: Reported on 7/17/2025), Disp: 2 mL, Rfl: 1    galcanezumab (Emgality) 120 mg/mL auto-injector, Inject 2 Syringes (240 mg) under the skin every 30 (thirty) days. (Patient not taking: Reported on 7/17/2025), Disp: 2 each, Rfl: 0    galcanezumab (Emgality) 120 mg/mL auto-injector, Inject 1 Syringe (120 mg) under the skin every 30 (thirty) days. Do not fill before July 3, 2025. (Patient not taking: Reported on 7/17/2025), Disp: 1 each, Rfl: 2    lamoTRIgine (LaMICtal) 150 mg tablet, Take 2 tablets (300 mg) by mouth early in the morning.. (Patient not taking: Reported on 7/17/2025), Disp: , Rfl:     lurasidone (Latuda) 40 mg tablet, Take 2 tablets (80 mg) by mouth once daily. (Patient not taking: Reported on 7/17/2025), Disp: , Rfl:     lurasidone (Latuda) 80 mg tablet, TAKE ONE TABLET BY MOUTH DAILY WITH 350 CALORIES (Patient not taking: Reported on 7/17/2025), Disp: , Rfl:     traZODone (Desyrel) 150 mg tablet, Take 1 tablet (150 mg) by mouth as needed at bedtime for sleep. (Patient not taking: Reported on 7/17/2025), Disp: , Rfl:     venlafaxine 150 mg 24 hr tablet, Take 1 tablet (150 mg) by mouth once daily in the morning. Take before meals. (Patient not taking: Reported on 7/17/2025), Disp: , Rfl:     venlafaxine XR (Effexor-XR) 75 mg 24 hr capsule, Take 1 capsule (75 mg) by mouth once daily. (Patient not taking: Reported on 7/17/2025), Disp: , Rfl:   [2]   Allergies  Allergen Reactions    Latex Unknown   [3]   Past Medical History:  Diagnosis Date    Acne     Anxiety     Anxiety and depression     Hirsutism     Migraines     Other conditions influencing health status     Denial Of Any Significant Medical History    Skin tag     Sleep apnea    [4]   Past Surgical History:  Procedure Laterality Date    OTHER SURGICAL HISTORY  09/16/2022    Appendectomy    OTHER SURGICAL HISTORY  09/16/2022    Tonsillectomy   [5]   Family History  Problem Relation Name Age of Onset    Bipolar disorder  Mother      Hypothyroidism Mother      Anxiety disorder Father      Depression Father      Fibromyalgia Father      Autoimmune disease Father      Sarcoidosis Father      Sleep apnea Father      Eczema Sister Jennifer     ADD / ADHD Brother Mauricio 0 - 9    Autism Daughter Romalynn 0 - 9    Anxiety disorder Other Grandparent     Depression Other Grandparent     Anxiety disorder Other Grandmother     Depression Other Grandmother     Arthritis Other      Heart disease Other      Diabetes type I Other      Arthritis Father Anton     Autoimmune disease Paternal Grandmother Kindra     Arthritis Paternal Grandmother Kindra     Depression Paternal Grandmother Kindra     Mental illness Mother Kindra     Mental illness Father Anton     Dementia Paternal Grandmother Kindra 60 - 69    Anxiety disorder Paternal Grandmother Kindra     Hearing loss Paternal Grandmother Kindra

## 2025-07-19 ENCOUNTER — PHARMACY VISIT (OUTPATIENT)
Dept: PHARMACY | Facility: CLINIC | Age: 25
End: 2025-07-19
Payer: MEDICAID

## 2025-07-20 LAB
HBV SURFACE AB SERPL IA-ACNC: >1000 MIU/ML
MEV IGG SER IA-ACNC: 195 AU/ML
MUV IGG SER IA-ACNC: 101 AU/ML
RUBV IGG SERPL IA-ACNC: 1.76 INDEX
VZV IGG SER IA-ACNC: 5.31 S/CO

## 2025-07-21 ENCOUNTER — PHARMACY VISIT (OUTPATIENT)
Dept: PHARMACY | Facility: CLINIC | Age: 25
End: 2025-07-21

## 2025-07-24 ENCOUNTER — SPECIALTY PHARMACY (OUTPATIENT)
Dept: PHARMACY | Facility: CLINIC | Age: 25
End: 2025-07-24

## 2025-07-25 ENCOUNTER — SPECIALTY PHARMACY (OUTPATIENT)
Dept: PHARMACY | Facility: CLINIC | Age: 25
End: 2025-07-25

## 2025-07-25 ENCOUNTER — APPOINTMENT (OUTPATIENT)
Dept: PRIMARY CARE | Facility: CLINIC | Age: 25
End: 2025-07-25
Payer: COMMERCIAL

## 2025-07-30 ENCOUNTER — APPOINTMENT (OUTPATIENT)
Dept: PRIMARY CARE | Facility: CLINIC | Age: 25
End: 2025-07-30
Payer: COMMERCIAL

## 2025-08-13 ENCOUNTER — APPOINTMENT (OUTPATIENT)
Dept: PRIMARY CARE | Facility: CLINIC | Age: 25
End: 2025-08-13
Payer: COMMERCIAL

## 2025-08-14 PROCEDURE — RXMED WILLOW AMBULATORY MEDICATION CHARGE

## 2025-08-21 ENCOUNTER — PHARMACY VISIT (OUTPATIENT)
Dept: PHARMACY | Facility: CLINIC | Age: 25
End: 2025-08-21
Payer: MEDICAID

## 2025-08-26 ENCOUNTER — TELEPHONE (OUTPATIENT)
Dept: PRIMARY CARE | Facility: CLINIC | Age: 25
End: 2025-08-26
Payer: COMMERCIAL

## 2025-09-02 ENCOUNTER — APPOINTMENT (OUTPATIENT)
Dept: DERMATOLOGY | Facility: CLINIC | Age: 25
End: 2025-09-02
Payer: COMMERCIAL

## 2025-09-04 ENCOUNTER — APPOINTMENT (OUTPATIENT)
Dept: NEUROLOGY | Facility: CLINIC | Age: 25
End: 2025-09-04
Payer: COMMERCIAL

## 2025-09-05 ENCOUNTER — OFFICE VISIT (OUTPATIENT)
Dept: PRIMARY CARE | Facility: CLINIC | Age: 25
End: 2025-09-05
Payer: COMMERCIAL

## 2025-09-05 VITALS
OXYGEN SATURATION: 98 % | BODY MASS INDEX: 60.69 KG/M2 | HEART RATE: 89 BPM | SYSTOLIC BLOOD PRESSURE: 138 MMHG | WEIGHT: 293 LBS | TEMPERATURE: 98.1 F | DIASTOLIC BLOOD PRESSURE: 84 MMHG

## 2025-09-05 DIAGNOSIS — J01.00 ACUTE NON-RECURRENT MAXILLARY SINUSITIS: Primary | ICD-10-CM

## 2025-09-05 PROCEDURE — 99213 OFFICE O/P EST LOW 20 MIN: CPT

## 2025-09-05 PROCEDURE — 1036F TOBACCO NON-USER: CPT

## 2025-09-05 RX ORDER — FLUTICASONE PROPIONATE 50 MCG
1 SPRAY, SUSPENSION (ML) NASAL DAILY
Qty: 16 G | Refills: 11 | Status: SHIPPED | OUTPATIENT
Start: 2025-09-05 | End: 2026-09-05

## 2025-09-05 RX ORDER — DOXYCYCLINE 100 MG/1
100 CAPSULE ORAL 2 TIMES DAILY
Qty: 14 CAPSULE | Refills: 0 | Status: SHIPPED | OUTPATIENT
Start: 2025-09-05 | End: 2025-09-12

## 2025-09-05 ASSESSMENT — PATIENT HEALTH QUESTIONNAIRE - PHQ9
SUM OF ALL RESPONSES TO PHQ9 QUESTIONS 1 AND 2: 0
1. LITTLE INTEREST OR PLEASURE IN DOING THINGS: NOT AT ALL
2. FEELING DOWN, DEPRESSED OR HOPELESS: NOT AT ALL

## 2025-09-12 ENCOUNTER — APPOINTMENT (OUTPATIENT)
Dept: OBSTETRICS AND GYNECOLOGY | Facility: CLINIC | Age: 25
End: 2025-09-12
Payer: COMMERCIAL

## 2025-10-22 ENCOUNTER — APPOINTMENT (OUTPATIENT)
Dept: OBSTETRICS AND GYNECOLOGY | Facility: CLINIC | Age: 25
End: 2025-10-22
Payer: COMMERCIAL

## 2026-01-15 ENCOUNTER — APPOINTMENT (OUTPATIENT)
Dept: ENDOCRINOLOGY | Facility: CLINIC | Age: 26
End: 2026-01-15
Payer: COMMERCIAL